# Patient Record
Sex: FEMALE | Race: WHITE | URBAN - METROPOLITAN AREA
[De-identification: names, ages, dates, MRNs, and addresses within clinical notes are randomized per-mention and may not be internally consistent; named-entity substitution may affect disease eponyms.]

---

## 2022-03-19 PROBLEM — Z78.9 PATIENT DENIES MEDICAL PROBLEMS: Status: ACTIVE | Noted: 2021-04-18

## 2024-01-04 ENCOUNTER — OFFICE VISIT (OUTPATIENT)
Dept: OCCUPATIONAL MEDICINE | Age: 49
End: 2024-01-04

## 2024-01-04 VITALS — TEMPERATURE: 98 F | HEART RATE: 61 BPM | OXYGEN SATURATION: 96 %

## 2024-01-04 DIAGNOSIS — Z76.0 MEDICATION REFILL: ICD-10-CM

## 2024-01-04 DIAGNOSIS — J01.00 ACUTE NON-RECURRENT MAXILLARY SINUSITIS: Primary | ICD-10-CM

## 2024-01-04 RX ORDER — AZITHROMYCIN 250 MG/1
TABLET, FILM COATED ORAL
Qty: 6 TABLET | Refills: 0 | Status: SHIPPED | OUTPATIENT
Start: 2024-01-04

## 2024-01-04 RX ORDER — FLUTICASONE PROPIONATE 50 MCG
2 SPRAY, SUSPENSION (ML) NASAL DAILY
Qty: 16 G | Refills: 0 | Status: SHIPPED | OUTPATIENT
Start: 2024-01-04

## 2024-01-04 NOTE — PROGRESS NOTES
PROGRESS NOTE    SUBJECTIVE:     Kamala Garcia is a 48 y.o. female seen for:    Chief Complaint    Sinusitis       Sinusitis  This is a new problem. Episode onset: Around [place]. The problem is unchanged. There has been no fever. Associated symptoms include congestion, coughing, ear pain, sinus pressure and sneezing. Pertinent negatives include no chills, diaphoresis, headaches, hoarse voice, neck pain, shortness of breath, sore throat or swollen glands.     Patient had a URI around [place] and has not gotten better. Patient exposed to sick coworkers.     Current Outpatient Medications   Medication Sig Dispense Refill    PROGESTERONE PO Take by mouth      folic acid-pyridoxine-cyanocobalamine (FOLTX) 2.5-25-1 MG TABS tablet Take 1 tablet by mouth daily      azithromycin (ZITHROMAX) 250 MG tablet Take 500mg on day 1 followed by 250mg on days 2 - 5 6 tablet 0    fluticasone (FLONASE) 50 MCG/ACT nasal spray 2 sprays by Nasal route daily 16 g 0     No current facility-administered medications for this visit.      Allergies   Allergen Reactions    Penicillins Other (See Comments)     Childhood reaction- went to hospital     Social History     Tobacco Use    Smoking status: Never    Smokeless tobacco: Never   Substance Use Topics    Alcohol use: Yes    Drug use: No      Review of Systems   Constitutional:  Positive for fatigue. Negative for chills, diaphoresis and fever.   HENT:  Positive for congestion, ear pain, postnasal drip, rhinorrhea, sinus pressure and sneezing. Negative for ear discharge, hoarse voice, sinus pain and sore throat.    Eyes: Negative.    Respiratory:  Positive for cough. Negative for chest tightness, shortness of breath and wheezing.         Cough is productive of thick yellow sputum.   Gastrointestinal:  Negative for abdominal distention, diarrhea and nausea.   Endocrine: Negative.    Genitourinary: Negative.    Musculoskeletal:  Negative for arthralgias, myalgias and neck pain.   Skin:

## 2024-01-05 RX ORDER — FLUTICASONE PROPIONATE 50 MCG
2 SPRAY, SUSPENSION (ML) NASAL DAILY
Qty: 48 G | OUTPATIENT
Start: 2024-01-05